# Patient Record
Sex: MALE | Race: WHITE
[De-identification: names, ages, dates, MRNs, and addresses within clinical notes are randomized per-mention and may not be internally consistent; named-entity substitution may affect disease eponyms.]

---

## 2018-06-08 PROBLEM — Z00.00 ENCOUNTER FOR PREVENTIVE HEALTH EXAMINATION: Status: ACTIVE | Noted: 2018-06-08

## 2018-06-18 ENCOUNTER — APPOINTMENT (OUTPATIENT)
Dept: MRI IMAGING | Facility: HOSPITAL | Age: 58
End: 2018-06-18

## 2018-11-09 ENCOUNTER — APPOINTMENT (OUTPATIENT)
Dept: VASCULAR SURGERY | Facility: CLINIC | Age: 58
End: 2018-11-09
Payer: COMMERCIAL

## 2018-11-09 VITALS — WEIGHT: 167 LBS | BODY MASS INDEX: 25.31 KG/M2 | HEIGHT: 68 IN

## 2018-11-09 PROCEDURE — 93971 EXTREMITY STUDY: CPT

## 2018-11-09 PROCEDURE — 99244 OFF/OP CNSLTJ NEW/EST MOD 40: CPT

## 2024-11-19 ENCOUNTER — OUTPATIENT (OUTPATIENT)
Dept: OUTPATIENT SERVICES | Facility: HOSPITAL | Age: 64
LOS: 1 days | Discharge: HOME | End: 2024-11-19

## 2024-11-19 VITALS
SYSTOLIC BLOOD PRESSURE: 110 MMHG | DIASTOLIC BLOOD PRESSURE: 58 MMHG | RESPIRATION RATE: 12 BRPM | OXYGEN SATURATION: 96 % | HEART RATE: 54 BPM

## 2024-11-19 VITALS
OXYGEN SATURATION: 99 % | WEIGHT: 161.38 LBS | HEIGHT: 67 IN | TEMPERATURE: 98 F | RESPIRATION RATE: 16 BRPM | SYSTOLIC BLOOD PRESSURE: 137 MMHG | DIASTOLIC BLOOD PRESSURE: 90 MMHG | HEART RATE: 79 BPM

## 2024-11-19 DIAGNOSIS — N44.00 TORSION OF TESTIS, UNSPECIFIED: Chronic | ICD-10-CM

## 2024-11-19 DIAGNOSIS — Z98.890 OTHER SPECIFIED POSTPROCEDURAL STATES: Chronic | ICD-10-CM

## 2024-11-19 DEVICE — LASER PROBE 25G CONSTELLATION: Type: IMPLANTABLE DEVICE | Site: RIGHT | Status: FUNCTIONAL

## 2024-11-19 DEVICE — GAS IOL HEXAFLUORIDE CYL: Type: IMPLANTABLE DEVICE | Site: RIGHT | Status: FUNCTIONAL

## 2024-11-19 RX ORDER — OFLOXACIN 3 MG/ML
1 SOLUTION OPHTHALMIC
Refills: 0 | Status: COMPLETED | OUTPATIENT
Start: 2024-11-19 | End: 2024-11-19

## 2024-11-19 RX ORDER — DUTASTERIDE 0.5 MG/1
1 CAPSULE, LIQUID FILLED ORAL
Refills: 0 | DISCHARGE

## 2024-11-19 RX ORDER — VALACYCLOVIR HYDROCHLORIDE 500 MG/1
1 TABLET ORAL
Refills: 0 | DISCHARGE

## 2024-11-19 RX ORDER — PHENYLEPHRINE HYDROCHLORIDE 25 MG/ML
1 SOLUTION/ DROPS OPHTHALMIC
Refills: 0 | Status: COMPLETED | OUTPATIENT
Start: 2024-11-19 | End: 2024-11-19

## 2024-11-19 RX ORDER — TROPICAMIDE 1 %
1 DROPS OPHTHALMIC (EYE)
Refills: 0 | Status: COMPLETED | OUTPATIENT
Start: 2024-11-19 | End: 2024-11-19

## 2024-11-19 RX ORDER — ALFUZOSIN HYDROCHLORIDE 10 MG/1
1 TABLET, EXTENDED RELEASE ORAL
Refills: 0 | DISCHARGE

## 2024-11-19 RX ORDER — ATROPINE SULFATE 1 %
1 DROPS OPHTHALMIC (EYE)
Refills: 0 | Status: COMPLETED | OUTPATIENT
Start: 2024-11-19 | End: 2024-11-19

## 2024-11-19 RX ADMIN — Medication 1 DROP(S): at 17:12

## 2024-11-19 RX ADMIN — PHENYLEPHRINE HYDROCHLORIDE 1 DROP(S): 25 SOLUTION/ DROPS OPHTHALMIC at 17:01

## 2024-11-19 RX ADMIN — Medication 1 DROP(S): at 17:02

## 2024-11-19 RX ADMIN — PHENYLEPHRINE HYDROCHLORIDE 1 DROP(S): 25 SOLUTION/ DROPS OPHTHALMIC at 17:08

## 2024-11-19 RX ADMIN — OFLOXACIN 1 DROP(S): 3 SOLUTION OPHTHALMIC at 17:02

## 2024-11-19 RX ADMIN — PHENYLEPHRINE HYDROCHLORIDE 1 DROP(S): 25 SOLUTION/ DROPS OPHTHALMIC at 17:11

## 2024-11-19 RX ADMIN — Medication 1 DROP(S): at 17:08

## 2024-11-19 RX ADMIN — OFLOXACIN 1 DROP(S): 3 SOLUTION OPHTHALMIC at 17:08

## 2024-11-19 RX ADMIN — OFLOXACIN 1 DROP(S): 3 SOLUTION OPHTHALMIC at 17:12

## 2024-11-19 RX ADMIN — Medication 1 DROP(S): at 17:01

## 2024-11-19 NOTE — ASU PATIENT PROFILE, ADULT - NSICDXPASTMEDICALHX_GEN_ALL_CORE_FT
PAST MEDICAL HISTORY:  Abnormal EKG     H/O aortic valve disorder      PAST MEDICAL HISTORY:  Abnormal EKG     H/O aortic valve disorder     History of elevated PSA     Hyperlipidemia     Lymphoma     Shingles

## 2024-11-19 NOTE — ASU PATIENT PROFILE, ADULT - NSTOBACCO TYPE_GEN_A_CORE_RD
Reason for Call:  Other call back    Detailed comments: Mother is breastfeeding and hs shingles on her breast.    Phone Number Patient can be reached at: Home number on file [phone] (home)    Best Time: Anytime    Can we leave a detailed message on this number? NO    Call taken on 4/21/2020 at 11:34 AM by Ann Marie Maher     Cigarettes

## 2024-11-19 NOTE — PRE-ANESTHESIA EVALUATION ADULT - NSANTHADDINFOFT_GEN_ALL_CORE
For conscious sedation Pt. accepts awareness to sight, sound, touch, pressure and memory of procedure.
For conscious sedation Pt. accepts awareness to sight, sound, touch, pressure and memory of procedure.  For GA Pt accepts all anesthesia risks .IBNLT: Dental, Pharyngeal, Laryngeal, Tracheal Trauma, Sore Throat, Hoarseness, Recurrent Laryngeal Nerve Dysfunction, Upper and Lower Extremity Paresthesias, Nausea and Vomiting, Potential exacerbation of asthma and AZUCENA.

## 2024-11-19 NOTE — ASU PATIENT PROFILE, ADULT - FALL HARM RISK - UNIVERSAL INTERVENTIONS
Bed in lowest position, wheels locked, appropriate side rails in place/Call bell, personal items and telephone in reach/Instruct patient to call for assistance before getting out of bed or chair/Non-slip footwear when patient is out of bed/[place] to call system/Physically safe environment - no spills, clutter or unnecessary equipment/Purposeful Proactive Rounding/Room/bathroom lighting operational, light cord in reach

## 2024-11-19 NOTE — OPERATIVE REPORT - OPERATIVE RPOSRT DETAILS
VITRECTOMY for a Primary Retinal Detachment    PATIENT NAME: ESVIN GIFFORD    ATTENDING: Chong Villar MD    PREOPERATIVE DIAGNOSIS(ES):  Rhegmatogenous Retinal Detachment, ____ eye     POSTOPERATIVE DIAGNOSIS(ES): Rhegmatogenous Retinal Detachment, ____ eye     PROCEDURE: Pars plana vitrectomy, endolaser, air-fluid exchange, air-gas exchange, all of the ____ eye     INDICATIONS:       The patient is a 64y year old Male with a history of a retinal detachment in the operative eye. After a detailed review of the risks, benefits and alternatives of the procedure, including but not limited to bleeding, infection, inflammation, retinal detachment, loss of vision, loss of eye, double vision, need for further surgery, and systemic risks of anesthesia and surgery, informed consent was obtained and the patient elected to proceed with the surgery.     PROCEDURE:       On the day of surgery, after clearance by medicine and anesthesia, the patient was brought to the operating room in stable condition. After verifying the correct surgical site, the patient was placed in the supine position. Intravenous sedation was provided by the anesthesia team.  After a brief time-out, a 1:1 mixture of 2% lidocaine and 0.75% Marcaine was injected in a retrobulbar fashion behind the operative eye. The patient was then prepped and draped in the usual sterile fashion.  Eyelashes were draped out of the operative field and a stainless steel eyelid speculum was placed in the operative eye.  A time-out was performed verifying correct patient, procedure, site, positioning and special equipment prior to starting the case.     A 25-gauge microcannula was placed in the inferotemporal quadrant in a beveled fashion 4 mm posterior to the limbus, as the patient was phakic. The infusion cannula was cleared of air, inserted into the microcannula, confirmed to be in the correct position within the vitreous cavity by direct visualization through the dilated pupil with the light pipe and then turned on under direct visualization. Two additional microcannulas were placed superonasally and superotemporally in a similar fashion. The vitrectomy hand piece and light were then inserted into the eye and the anterior vitreous was cleared under direct visualization.     Then, under indirect wide field visualization, a core and peripheral vitrectomy was performed. The posterior hyaloid was  using active aspiration and Kenalog assistance.  The vitreous was excised 360 degrees to the posterior vitreous base and no additional retinal breaks were noted. Perfluorocarbon liquid was placed posteriorly to stabilize the retina.  The retina was noted to flatten nicely. 360 degree scleral depression was used to confirm there were no additional breaks other than the ones seen pre-operatively. Diathermy was used to kayleen the edges of the breaks.  Endophotocoagulation was then placed on the posterior edges of the retinal breaks identified.     A backflush was then used to perform an air-fluid exchange, taking care to drain the subretinal fluid from the most posterior break. Following the air-fluid exchange, additional laser was placed on the anterior aspects of the breaks with endophotocoagulation.       Following the air-fluid exchange, the superonasal microcannula was removed and the sclerotomy was airtight. ___ % SF6 gas was drawn up by the attending physician and an air-gas exchange was performed. The remaining microcannulas were removed from the eye and the sclerotomies were airtight and the eye remained at a physiologic pressure by palpation.     The eyelid speculum was removed from the eye. Betadine was cleaned from around the eye. A topical steroid/antibiotic cream was placed on the eye, followed by a patch and a clear plastic shield. The patient tolerated the procedure well and left the operating room in stable condition.      VITRECTOMY for a Primary Retinal Detachment    PATIENT NAME: ESVIN GIFFORD    ATTENDING: Chong Villar MD    PREOPERATIVE DIAGNOSIS(ES):  Rhegmatogenous Retinal Detachment, right eye     POSTOPERATIVE DIAGNOSIS(ES): Rhegmatogenous Retinal Detachment, right eye     PROCEDURE: Pars plana vitrectomy, endolaser, air-fluid exchange, air-gas exchange, all of the right eye     INDICATIONS:       The patient is a 64y year old Male with a history of a retinal detachment in the operative eye. After a detailed review of the risks, benefits and alternatives of the procedure, including but not limited to bleeding, infection, inflammation, retinal detachment, loss of vision, loss of eye, double vision, need for further surgery, and systemic risks of anesthesia and surgery, informed consent was obtained and the patient elected to proceed with the surgery.     PROCEDURE:       On the day of surgery, after clearance by medicine and anesthesia, the patient was brought to the operating room in stable condition. After verifying the correct surgical site, the patient was placed in the supine position. Intravenous sedation was provided by the anesthesia team.  After a brief time-out, a 1:1 mixture of 2% lidocaine and 0.75% Marcaine was injected in a retrobulbar fashion behind the operative eye. The patient was then prepped and draped in the usual sterile fashion.  Eyelashes were draped out of the operative field and a stainless steel eyelid speculum was placed in the operative eye.  A time-out was performed verifying correct patient, procedure, site, positioning and special equipment prior to starting the case.     A 25-gauge microcannula was placed in the inferotemporal quadrant in a beveled fashion 4 mm posterior to the limbus, as the patient was phakic. The infusion cannula was cleared of air, inserted into the microcannula, confirmed to be in the correct position within the vitreous cavity by direct visualization through the dilated pupil with the light pipe and then turned on under direct visualization. Two additional microcannulas were placed superonasally and superotemporally in a similar fashion. The vitrectomy hand piece and light were then inserted into the eye and the anterior vitreous was cleared under direct visualization.     Then, under indirect wide field visualization, a core and peripheral vitrectomy was performed. The posterior hyaloid was  using active aspiration and Kenalog assistance.  The vitreous was excised 360 degrees to the posterior vitreous base and no additional retinal breaks were noted. Perfluorocarbon liquid was placed posteriorly to stabilize the retina.  The retina was noted to flatten nicely. 360 degree scleral depression was used to confirm there were no additional breaks other than the ones seen pre-operatively. Diathermy was used to kayleen the edges of the breaks.  Endophotocoagulation was then placed on the posterior edges of the retinal breaks identified.     A backflush was then used to perform an air-fluid exchange, taking care to drain the subretinal fluid from the most posterior break. Following the air-fluid exchange, additional laser was placed on the anterior aspects of the breaks with endophotocoagulation.       Following the air-fluid exchange, the superonasal microcannula was removed and the sclerotomy was airtight. ___ % SF6 gas was drawn up by the attending physician and an air-gas exchange was performed. The remaining microcannulas were removed from the eye and the sclerotomies were airtight and the eye remained at a physiologic pressure by palpation.     The eyelid speculum was removed from the eye. Betadine was cleaned from around the eye. A topical steroid/antibiotic cream was placed on the eye, followed by a patch and a clear plastic shield. The patient tolerated the procedure well and left the operating room in stable condition.      VITRECTOMY for a Primary Retinal Detachment    PATIENT NAME: ESVIN GIFFORD    ATTENDING: Chong Villar MD    PREOPERATIVE DIAGNOSIS(ES):  Rhegmatogenous Retinal Detachment, right eye     POSTOPERATIVE DIAGNOSIS(ES): Rhegmatogenous Retinal Detachment, right eye     PROCEDURE: Pars plana vitrectomy, endolaser, air-fluid exchange, air-gas exchange, all of the right eye     INDICATIONS:       The patient is a 64y year old Male with a history of a retinal detachment in the operative eye. After a detailed review of the risks, benefits and alternatives of the procedure, including but not limited to bleeding, infection, inflammation, retinal detachment, loss of vision, loss of eye, double vision, need for further surgery, and systemic risks of anesthesia and surgery, informed consent was obtained and the patient elected to proceed with the surgery.     PROCEDURE:       On the day of surgery, after clearance by medicine and anesthesia, the patient was brought to the operating room in stable condition. After verifying the correct surgical site, the patient was placed in the supine position. Intravenous sedation was provided by the anesthesia team.  After a brief time-out, a 1:1 mixture of 2% lidocaine and 0.75% Marcaine was injected in a retrobulbar fashion behind the operative eye. The patient was then prepped and draped in the usual sterile fashion.  Eyelashes were draped out of the operative field and a stainless steel eyelid speculum was placed in the operative eye.  A time-out was performed verifying correct patient, procedure, site, positioning and special equipment prior to starting the case.     A 25-gauge microcannula was placed in the inferotemporal quadrant in a beveled fashion 4 mm posterior to the limbus, as the patient was phakic. The infusion cannula was cleared of air, inserted into the microcannula, confirmed to be in the correct position within the vitreous cavity by direct visualization through the dilated pupil with the light pipe and then turned on under direct visualization. Two additional microcannulas were placed superonasally and superotemporally in a similar fashion. The vitrectomy hand piece and light were then inserted into the eye and the anterior vitreous was cleared under direct visualization.     Then, under indirect wide field visualization, a core and peripheral vitrectomy was performed. The posterior hyaloid was  using active aspiration and Kenalog assistance.  The vitreous was excised 360 degrees to the posterior vitreous base and no additional retinal breaks were noted. 360 degree scleral depression was used to confirm there were no additional breaks other than the ones seen pre-operatively. Diathermy was used to kayleen the edges of the breaks. superior retinotomy was placed using diathermy. Endophotocoagulation was then placed on the posterior edges of the retinal breaks identified.     A backflush was then used to perform an air-fluid exchange, taking care to drain the subretinal fluid from the  retinotomy. Following the air-fluid exchange, additional laser was placed on the anterior aspects of the breaks with endophotocoagulation.       Following the air-fluid exchange, the superonasal microcannula was removed and the sclerotomy was airtight. ___ % SF6 gas was drawn up by the attending physician and an air-gas exchange was performed. The remaining microcannulas were removed from the eye and the sclerotomies were airtight and the eye remained at a physiologic pressure by palpation.     The eyelid speculum was removed from the eye. Betadine was cleaned from around the eye. A topical steroid/antibiotic cream was placed on the eye, followed by a patch and a clear plastic shield. The patient tolerated the procedure well and left the operating room in stable condition.      VITRECTOMY for a Primary Retinal Detachment    PATIENT NAME: ESVIN GIFFORD    ATTENDING: Chong Villar MD    PREOPERATIVE DIAGNOSIS(ES):  Rhegmatogenous Retinal Detachment, right eye     POSTOPERATIVE DIAGNOSIS(ES): Rhegmatogenous Retinal Detachment, right eye     PROCEDURE: Pars plana vitrectomy, endolaser, air-fluid exchange, air-gas exchange, all of the right eye     INDICATIONS:       The patient is a 64y year old Male with a history of a retinal detachment in the operative eye. After a detailed review of the risks, benefits and alternatives of the procedure, including but not limited to bleeding, infection, inflammation, retinal detachment, loss of vision, loss of eye, double vision, need for further surgery, and systemic risks of anesthesia and surgery, informed consent was obtained and the patient elected to proceed with the surgery.     PROCEDURE:       On the day of surgery, after clearance by medicine and anesthesia, the patient was brought to the operating room in stable condition. After verifying the correct surgical site, the patient was placed in the supine position. Intravenous sedation was provided by the anesthesia team.  After a brief time-out, a 1:1 mixture of 2% lidocaine and 0.75% Marcaine was injected in a retrobulbar fashion behind the operative eye. The patient was then prepped and draped in the usual sterile fashion.  Eyelashes were draped out of the operative field and a stainless steel eyelid speculum was placed in the operative eye.  A time-out was performed verifying correct patient, procedure, site, positioning and special equipment prior to starting the case.     A 25-gauge microcannula was placed in the inferotemporal quadrant in a beveled fashion 4 mm posterior to the limbus, as the patient was phakic. The infusion cannula was cleared of air, inserted into the microcannula, confirmed to be in the correct position within the vitreous cavity by direct visualization through the dilated pupil with the light pipe and then turned on under direct visualization. Two additional microcannulas were placed superonasally and superotemporally in a similar fashion. The vitrectomy hand piece and light were then inserted into the eye and the anterior vitreous was cleared under direct visualization.     Then, under indirect wide field visualization, a core and peripheral vitrectomy was performed. The posterior hyaloid was  using active aspiration and Kenalog assistance.  The vitreous was excised 360 degrees to the posterior vitreous base and no additional retinal breaks were noted. 360 degree scleral depression was used to confirm there were no additional breaks other than the ones seen pre-operatively. Diathermy was used to kayleen the edges of the breaks. superior retinotomy was placed using diathermy. Endophotocoagulation was then placed on the posterior edges of the retinal breaks identified.     A backflush was then used to perform an air-fluid exchange, taking care to drain the subretinal fluid from the  retinotomy. Following the air-fluid exchange, additional laser was placed on the anterior aspects of the breaks with endophotocoagulation.       Following the air-fluid exchange, the superonasal microcannula was removed and the sclerotomy was airtight. ___22 % SF6 gas was drawn up by the attending physician and an air-gas exchange was performed. The remaining microcannulas were removed from the eye .  All sclerotomy sites were closed with 8-0 vicryl suture. the eye remained at a physiologic pressure by palpation.     The eyelid speculum was removed from the eye. Betadine was cleaned from around the eye. A topical steroid/antibiotic cream was placed on the eye, followed by a patch and a clear plastic shield. The patient tolerated the procedure well and left the operating room in stable condition.

## 2024-11-19 NOTE — ASU PATIENT PROFILE, ADULT - VISION (WITH CORRECTIVE LENSES IF THE PATIENT USUALLY WEARS THEM):
right eye visual disturbance/Partially impaired: cannot see medication labels or newsprint, but can see obstacles in path, and the surrounding layout; can count fingers at arm's length

## 2024-11-19 NOTE — PRE-ANESTHESIA EVALUATION ADULT - NSANTHOSAYNRD_GEN_A_CORE
No. AZUCENA screening performed.  STOP BANG Legend: 0-2 = LOW Risk; 3-4 = INTERMEDIATE Risk; 5-8 = HIGH Risk
No. AZUCENA screening performed.  STOP BANG Legend: 0-2 = LOW Risk; 3-4 = INTERMEDIATE Risk; 5-8 = HIGH Risk

## 2024-12-31 PROBLEM — E78.5 HYPERLIPIDEMIA, UNSPECIFIED: Chronic | Status: ACTIVE | Noted: 2024-11-19

## 2024-12-31 PROBLEM — Z86.79 PERSONAL HISTORY OF OTHER DISEASES OF THE CIRCULATORY SYSTEM: Chronic | Status: ACTIVE | Noted: 2024-11-19

## 2024-12-31 PROBLEM — Z87.898 PERSONAL HISTORY OF OTHER SPECIFIED CONDITIONS: Chronic | Status: ACTIVE | Noted: 2024-11-19

## 2024-12-31 PROBLEM — C85.90 NON-HODGKIN LYMPHOMA, UNSPECIFIED, UNSPECIFIED SITE: Chronic | Status: ACTIVE | Noted: 2024-11-19

## 2024-12-31 PROBLEM — R94.31 ABNORMAL ELECTROCARDIOGRAM [ECG] [EKG]: Chronic | Status: ACTIVE | Noted: 2024-11-19

## 2024-12-31 NOTE — ASU PATIENT PROFILE, ADULT - REASON FOR ADMISSION, PROFILE
Attempt Repair of Retinal Detachment via Pars Plana Vitrectomy, Possible Endolaser, Scleral Buckle, Silicone Oil, Right Eye

## 2024-12-31 NOTE — ASU PATIENT PROFILE, ADULT - NS PREOP UNDERSTANDS INFO
No solid food/dairy/candy/gum after 08:00am Thursday; water allowed before 14:00pm Thursday; patient reminded to come with photo ID/insurance/credit card; dress in comfortable clothes; no jewelries/contact lens/valuable; no smoking/alcohol drinking/recreational drug use Wednesday; escort to have photo ID; address and callback number was given/yes

## 2024-12-31 NOTE — ASU PATIENT PROFILE, ADULT - NSICDXPASTMEDICALHX_GEN_ALL_CORE_FT
PAST MEDICAL HISTORY:  Abnormal EKG     H/O aortic valve disorder     History of elevated PSA     Hyperlipidemia     Lymphoma     Shingles

## 2025-01-02 ENCOUNTER — OUTPATIENT (OUTPATIENT)
Dept: OUTPATIENT SERVICES | Facility: HOSPITAL | Age: 65
LOS: 1 days | Discharge: HOME | End: 2025-01-02

## 2025-01-02 VITALS
OXYGEN SATURATION: 96 % | HEART RATE: 52 BPM | SYSTOLIC BLOOD PRESSURE: 149 MMHG | RESPIRATION RATE: 13 BRPM | DIASTOLIC BLOOD PRESSURE: 82 MMHG

## 2025-01-02 VITALS
HEIGHT: 67 IN | RESPIRATION RATE: 16 BRPM | OXYGEN SATURATION: 98 % | HEART RATE: 67 BPM | DIASTOLIC BLOOD PRESSURE: 84 MMHG | WEIGHT: 164.91 LBS | TEMPERATURE: 98 F | SYSTOLIC BLOOD PRESSURE: 142 MMHG

## 2025-01-02 DIAGNOSIS — Z86.69 PERSONAL HISTORY OF OTHER DISEASES OF THE NERVOUS SYSTEM AND SENSE ORGANS: Chronic | ICD-10-CM

## 2025-01-02 DIAGNOSIS — N44.00 TORSION OF TESTIS, UNSPECIFIED: Chronic | ICD-10-CM

## 2025-01-02 DIAGNOSIS — Z98.890 OTHER SPECIFIED POSTPROCEDURAL STATES: Chronic | ICD-10-CM

## 2025-01-02 DEVICE — RETINAL  ADATOSIL OIL 10CC: Type: IMPLANTABLE DEVICE | Site: RIGHT | Status: FUNCTIONAL

## 2025-01-02 DEVICE — LASER PROBE 25G CONSTELLATION: Type: IMPLANTABLE DEVICE | Site: RIGHT | Status: FUNCTIONAL

## 2025-01-02 RX ORDER — SODIUM CHLORIDE 9 MG/ML
500 INJECTION, SOLUTION INTRAVENOUS
Refills: 0 | Status: DISCONTINUED | OUTPATIENT
Start: 2025-01-02 | End: 2025-01-02

## 2025-01-02 RX ORDER — ONDANSETRON 4 MG/1
4 TABLET ORAL ONCE
Refills: 0 | Status: DISCONTINUED | OUTPATIENT
Start: 2025-01-02 | End: 2025-01-02

## 2025-01-02 RX ORDER — ATROPINE SULFATE 1 %
1 DROPS OPHTHALMIC (EYE)
Refills: 0 | Status: COMPLETED | OUTPATIENT
Start: 2025-01-02 | End: 2025-01-02

## 2025-01-02 RX ORDER — OFLOXACIN 0.3 %
1 DROPS OPHTHALMIC (EYE)
Refills: 0 | Status: COMPLETED | OUTPATIENT
Start: 2025-01-02 | End: 2025-01-02

## 2025-01-02 RX ORDER — KETOROLAC TROMETHAMINE 30 MG/ML
30 INJECTION INTRAMUSCULAR; INTRAVENOUS ONCE
Refills: 0 | Status: DISCONTINUED | OUTPATIENT
Start: 2025-01-02 | End: 2025-01-02

## 2025-01-02 RX ORDER — TROPICAMIDE
1 POWDER (GRAM) MISCELLANEOUS
Refills: 0 | Status: COMPLETED | OUTPATIENT
Start: 2025-01-02 | End: 2025-01-02

## 2025-01-02 RX ORDER — PHENYLEPHRINE HYDROCHLORIDE 25 MG/ML
1 SOLUTION OPHTHALMIC
Refills: 0 | Status: COMPLETED | OUTPATIENT
Start: 2025-01-02 | End: 2025-01-02

## 2025-01-02 RX ORDER — ACETAMINOPHEN 80 MG/.8ML
1000 SOLUTION/ DROPS ORAL ONCE
Refills: 0 | Status: COMPLETED | OUTPATIENT
Start: 2025-01-02 | End: 2025-01-02

## 2025-01-02 RX ADMIN — Medication 1 DROP(S): at 16:15

## 2025-01-02 RX ADMIN — Medication 1 DROP(S): at 16:10

## 2025-01-02 RX ADMIN — PHENYLEPHRINE HYDROCHLORIDE 1 DROP(S): 25 SOLUTION OPHTHALMIC at 16:10

## 2025-01-02 RX ADMIN — Medication 1 DROP(S): at 16:05

## 2025-01-02 RX ADMIN — KETOROLAC TROMETHAMINE 30 MILLIGRAM(S): 30 INJECTION INTRAMUSCULAR; INTRAVENOUS at 19:50

## 2025-01-02 RX ADMIN — ACETAMINOPHEN 1000 MILLIGRAM(S): 80 SOLUTION/ DROPS ORAL at 19:07

## 2025-01-02 RX ADMIN — PHENYLEPHRINE HYDROCHLORIDE 1 DROP(S): 25 SOLUTION OPHTHALMIC at 16:05

## 2025-01-02 RX ADMIN — PHENYLEPHRINE HYDROCHLORIDE 1 DROP(S): 25 SOLUTION OPHTHALMIC at 16:15

## 2025-01-03 NOTE — PROCEDURE NOTE - ADDITIONAL PROCEDURE DETAILS
After the patient was draped and surgical site confirmed, a 25 gauge trocar was placed 4.0 mm posterior to limbus inferotemporal. The infusion line was placed in this port and once the correct location was confirmed, the infusion was turned on. Two more port sites were placed superotemporally and superonasally. Then the wide angle viewing system was used. An inferior retinal detachment was observed. There was minimal vitreous remaining in the periphery. There was a large tear visualized at 10 oclock.     Kenalog was instilled into the vitreous cavity. Minimal residual vitreous was observed. Additional shaving was performed around the periphery especially at the site of the tear. Then air fluid exchange was performed. The retina relaxed and flattened. Laser was applied around the tear and sectorally in the inferotemporal quadrant with good uptake. Silicone oil 5,000 CS was instilled into the eye. Once the eye was confirmed, a good physiological pressure, the sclerotomy sites were sutured with 7-0 vicryl. Subconjunctival dexamethasone and gentamicin was injected. The eye was patched. The patient left the operating room under the care of anesthesia.

## 2025-01-03 NOTE — PROCEDURE NOTE - NSCOMPLICATION_GEN_A_CORE
Patient reports that he is overall doing well since medication changes that were made 7/15 with prednisone and mestinon.     He has noticed some lower energy levels, but has history of this and previously needed B12 injections, has appt with PCP to check B12 levels in a couple weeks.     Still recovering from back fractures and feels that low energy may be related to this healing process.     He would like your consideration to decrease prednisone further. Current dose is 40mg every other day.     He does admit to often missing 30mg afternoon dose of mestinon, RN asked him to be consistent with 60mg AM/30mg evening dosing. He verbalizes understanding.     Please advise if you would like to decrease prednisone further.     Deshawn Pedro, RN, BSN  [facility]         no complications

## 2025-03-06 RX ORDER — SODIUM CHLORIDE 9 G/1000ML
1000 INJECTION, SOLUTION INTRAVENOUS
Refills: 0 | Status: DISCONTINUED | OUTPATIENT
Start: 2025-03-12 | End: 2025-03-12

## 2025-03-11 NOTE — ASU PATIENT PROFILE, ADULT - MEDICATION HERBAL REMEDIES, PROFILE
Subjective   Patient ID: Arie Agustin is a 83 y.o. male who presents for Follow-up (4 week), Hypertension, and Results.  A1c check  Doing well  6.3 today no indication fro therapy    Diarrhea  Two episodes over the past month  Doing well      Hypertension  Patient is here for follow-up of elevated blood pressure. He is exercising and is adherent to a low-salt diet. Blood pressure is well controlled at home. Cardiac symptoms: none. Patient denies chest pain, claudication, exertional chest pressure/discomfort, and irregular heart beat. Cardiovascular risk factors: advanced age (older than 55 for men, 65 for women). Use of agents associated with hypertension: none. History of target organ damage: none.          Vitals:    09/03/24 0927   BP: 146/84   Pulse: 65   Temp: 36.3 °C (97.3 °F)   SpO2: 98%       Review of Systems    Objective   Physical Exam    Assessment/Plan   Problem List Items Addressed This Visit       Hypertension - Primary    Relevant Medications    olmesartan-hydrochlorothiazide (Benicar HCT) 40-12.5 mg tablet     Other Visit Diagnoses       Diabetes mellitus screening        Elevated glucose level        Relevant Orders    POCT glycosylated hemoglobin (Hb A1C) manually resulted (Completed)                 Thank you for coming in today, please call my office if you have any concerns or questions.     Marcelino CONTRERAS, CNP   no

## 2025-03-11 NOTE — ASU PATIENT PROFILE, ADULT - NSICDXPASTSURGICALHX_GEN_ALL_CORE_FT
PAST SURGICAL HISTORY:  H/O hernia repair     H/O retinal detachment     Testicular torsion      PAST SURGICAL HISTORY:  H/O hernia repair     H/O retinal detachment Right eye x2    Testicular torsion

## 2025-03-11 NOTE — ASU PATIENT PROFILE, ADULT - REASON FOR ADMISSION, PROFILE
Attempt Repair of Retinal Detachment via Pars Plana Vitrectomy, Possible Endolaser, Scleral Buckle, Silicone Oil, Right Eye Right Eye Vitrectomy oil removal

## 2025-03-11 NOTE — ASU PATIENT PROFILE, ADULT - ABLE TO REACH PT
left [phone]  Arrival 0915 AM We are located at [address] [place] [address], between [address] on the first floor,  parking is 22/8 hours, bring picture ID, insurance card, Debit or credit card, escort needs picture ID, No solids or dairy products after midnight, water until 3 hours before surgery. No jewelry, creams lotions powders, colognes, hard candies, chewing gum. NO DM meds in the morning, Any other meds if normally taken in the morning can be taken per MD instruction  Wear comfortable clothing   Call  [phone]  To confirm/no

## 2025-03-12 ENCOUNTER — OUTPATIENT (OUTPATIENT)
Dept: OUTPATIENT SERVICES | Facility: HOSPITAL | Age: 65
LOS: 1 days | Discharge: HOME | End: 2025-03-12

## 2025-03-12 VITALS
WEIGHT: 159.61 LBS | OXYGEN SATURATION: 96 % | HEART RATE: 75 BPM | RESPIRATION RATE: 16 BRPM | SYSTOLIC BLOOD PRESSURE: 129 MMHG | DIASTOLIC BLOOD PRESSURE: 80 MMHG | HEIGHT: 67 IN | TEMPERATURE: 98 F

## 2025-03-12 VITALS
SYSTOLIC BLOOD PRESSURE: 101 MMHG | HEART RATE: 53 BPM | OXYGEN SATURATION: 96 % | DIASTOLIC BLOOD PRESSURE: 64 MMHG | TEMPERATURE: 97 F | RESPIRATION RATE: 16 BRPM

## 2025-03-12 DIAGNOSIS — Z98.890 OTHER SPECIFIED POSTPROCEDURAL STATES: Chronic | ICD-10-CM

## 2025-03-12 DIAGNOSIS — N44.00 TORSION OF TESTIS, UNSPECIFIED: Chronic | ICD-10-CM

## 2025-03-12 DIAGNOSIS — Z86.69 PERSONAL HISTORY OF OTHER DISEASES OF THE NERVOUS SYSTEM AND SENSE ORGANS: Chronic | ICD-10-CM

## 2025-03-12 DEVICE — LASER PROBE 25G CONSTELLATION: Type: IMPLANTABLE DEVICE | Status: FUNCTIONAL

## 2025-03-12 RX ORDER — TROPICAMIDE
1 POWDER (GRAM) MISCELLANEOUS
Refills: 0 | Status: COMPLETED | OUTPATIENT
Start: 2025-03-12 | End: 2025-03-12

## 2025-03-12 RX ORDER — ACETAMINOPHEN 500 MG/5ML
650 LIQUID (ML) ORAL ONCE
Refills: 0 | Status: DISCONTINUED | OUTPATIENT
Start: 2025-03-12 | End: 2025-03-12

## 2025-03-12 RX ORDER — ATROPINE SULFATE 1 %
1 OINTMENT (GRAM) OPHTHALMIC (EYE)
Refills: 0 | Status: COMPLETED | OUTPATIENT
Start: 2025-03-12 | End: 2025-03-12

## 2025-03-12 RX ORDER — PHENYLEPHRINE HYDROCHLORIDE 25 MG/ML
1 SOLUTION OPHTHALMIC
Refills: 0 | Status: COMPLETED | OUTPATIENT
Start: 2025-03-12 | End: 2025-03-12

## 2025-03-12 RX ORDER — ONDANSETRON HCL/PF 4 MG/2 ML
4 VIAL (ML) INJECTION ONCE
Refills: 0 | Status: DISCONTINUED | OUTPATIENT
Start: 2025-03-12 | End: 2025-03-12

## 2025-03-12 RX ORDER — SODIUM CHLORIDE 9 G/1000ML
500 INJECTION, SOLUTION INTRAVENOUS
Refills: 0 | Status: DISCONTINUED | OUTPATIENT
Start: 2025-03-12 | End: 2025-03-12

## 2025-03-12 RX ADMIN — Medication 1 DROP(S): at 10:00

## 2025-03-12 RX ADMIN — Medication 1 DROP(S): at 10:10

## 2025-03-12 RX ADMIN — PHENYLEPHRINE HYDROCHLORIDE 1 DROP(S): 25 SOLUTION OPHTHALMIC at 10:05

## 2025-03-12 RX ADMIN — Medication 1 DROP(S): at 10:05

## 2025-03-12 RX ADMIN — PHENYLEPHRINE HYDROCHLORIDE 1 DROP(S): 25 SOLUTION OPHTHALMIC at 10:00

## 2025-03-12 RX ADMIN — PHENYLEPHRINE HYDROCHLORIDE 1 DROP(S): 25 SOLUTION OPHTHALMIC at 10:10

## 2025-03-12 NOTE — OPERATIVE REPORT - OPERATIVE RPOSRT DETAILS
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 After a detailed review of the risks, benefits and alternatives of the procedure, including but not limited to bleeding, infection, inflammation, retinal detachment, loss of vision, loss of eye, double vision, unclear visual potential, need for further surgery, and systemic risks of anesthesia and surgery, informed consent was obtained and the patient elected to proceed with the surgery.     PROCEDURE:       On the day of surgery, after clearance by medicine and anesthesia, the patient was brought to the operating room in stable condition. After verifying the correct surgical site, the patient was placed in the supine position. Intravenous sedation was provided by the anesthesia team.  After a brief time-out, a 1:1 mixture of 2% lidocaine and 0.75% Marcaine was injected in a retrobulbar fashion behind the operative eye. The patient was then prepped and draped in the usual sterile fashion.  Eyelashes were draped out of the operative field and a stainless steel eyelid speculum was placed in the operative eye.  A time-out was performed verifying correct patient, procedure, site, positioning and special equipment prior to starting the case.      A 25-gauge microcannula was placed in the inferotemporal quadrant in a beveled fashion 3.5 mm posterior to the limbus. The infusion cannula was cleared of air, inserted into the microcannula, confirmed to be in the correct position within the vitreous cavity by direct visualization through the dilated pupil with the light pipe and then turned on under direct visualization. Two additional microcannulas were placed superonasally and superotemporally in a similar fashion. The VFC (Viscous Fluid Control) hand piece and light were then inserted into the eye and, under indirect wide field visualization, the silicone oil was removed from the eye.     A backflush was then used to perform a partial airfluid exchange and the retained silicone oil at the interface between the water and air was removed. This was repeated multiple times until there was no longer any visible oil in the interface. A final air-fluid exchange was performed and      360 degree scleral indentation was performed.  No holes, tears, or detachments were identified.       Following the air-fluid exchange, the microcannulas were removed from the eye.  All sclerotomies remained airtight and the eye remained at a physiologic pressure by palpation.     The eyelid speculum was removed from the eye. Betadine was cleaned from around the eye. A topical steroid/antibiotic cream was placed on the eye, followed by a patch and a clear plastic shield. The patient tolerated the procedure well and left the operating room in stable condition.

## 2025-03-12 NOTE — OPERATIVE REPORT - NSICDXBRIEFPROCEDURE_GEN_ALL_CORE_FT
PROCEDURES:  Vitrectomy using 25-gauge instruments with removal of silicone oil 12-Mar-2025 12:56:13  Cedric Simental

## 2025-03-12 NOTE — ASU PREOP CHECKLIST - BP NONINVASIVE DIASTOLIC (MM HG)
What Type Of Note Output Would You Prefer (Optional)?: Standard Output Hpi Title: Evaluation of Skin Lesions How Severe Are Your Spot(S)?: mild 80

## (undated) DEVICE — BACKFLUSH SOFT TIP 25G

## (undated) DEVICE — Device

## (undated) DEVICE — SUT POLYSORB 8-0 5" MV-135-5

## (undated) DEVICE — SYR FILTER 22 MICRONS

## (undated) DEVICE — SYR LUER LOK 50CC

## (undated) DEVICE — CONSTELLATION VFC PAK

## (undated) DEVICE — ILM RESOLUTION FORCEP 25G DISP

## (undated) DEVICE — CANNULA BLUNT TIP 25GA

## (undated) DEVICE — DRSG TAPE TRANSPORE 1"

## (undated) DEVICE — ELCTR WETFIELD ERASER FINE TIP 25GA

## (undated) DEVICE — PACK VITRECTOMY  LF

## (undated) DEVICE — MILLEX HA 45UM

## (undated) DEVICE — CANNULA ALCON SOFT TIP 25G

## (undated) DEVICE — FORCEP GRIESHABER MAXGRIP 25GA DISP

## (undated) DEVICE — ELCTR BIPOLAR CORD 12FT

## (undated) DEVICE — GLV 7.5 PROTEXIS (WHITE)

## (undated) DEVICE — DRAPE MICROSCOPE KNOB COVER SMALL (2 PCS)

## (undated) DEVICE — WARMING BLANKET LOWER ADULT

## (undated) DEVICE — PACK VITRECTOMY CONSTELLATION POST 25G 10K

## (undated) DEVICE — VENODYNE/SCD SLEEVE CALF MEDIUM

## (undated) DEVICE — ILM RESOLUTION FORCEP 23G DISP